# Patient Record
Sex: MALE | Race: WHITE | NOT HISPANIC OR LATINO
[De-identification: names, ages, dates, MRNs, and addresses within clinical notes are randomized per-mention and may not be internally consistent; named-entity substitution may affect disease eponyms.]

---

## 2017-01-11 ENCOUNTER — TRANSCRIPTION ENCOUNTER (OUTPATIENT)
Age: 57
End: 2017-01-11

## 2017-07-03 ENCOUNTER — OUTPATIENT (OUTPATIENT)
Dept: OUTPATIENT SERVICES | Facility: HOSPITAL | Age: 57
LOS: 1 days | End: 2017-07-03
Payer: MEDICARE

## 2017-07-03 ENCOUNTER — TRANSCRIPTION ENCOUNTER (OUTPATIENT)
Age: 57
End: 2017-07-03

## 2017-07-03 VITALS
RESPIRATION RATE: 20 BRPM | DIASTOLIC BLOOD PRESSURE: 53 MMHG | OXYGEN SATURATION: 95 % | HEART RATE: 82 BPM | HEIGHT: 71 IN | TEMPERATURE: 98 F | SYSTOLIC BLOOD PRESSURE: 96 MMHG | WEIGHT: 270.73 LBS

## 2017-07-03 VITALS
SYSTOLIC BLOOD PRESSURE: 99 MMHG | HEART RATE: 78 BPM | RESPIRATION RATE: 15 BRPM | OXYGEN SATURATION: 97 % | DIASTOLIC BLOOD PRESSURE: 54 MMHG

## 2017-07-03 DIAGNOSIS — H33.011 RETINAL DETACHMENT WITH SINGLE BREAK, RIGHT EYE: ICD-10-CM

## 2017-07-03 DIAGNOSIS — Z95.810 PRESENCE OF AUTOMATIC (IMPLANTABLE) CARDIAC DEFIBRILLATOR: Chronic | ICD-10-CM

## 2017-07-03 DIAGNOSIS — Z90.89 ACQUIRED ABSENCE OF OTHER ORGANS: Chronic | ICD-10-CM

## 2017-07-03 PROCEDURE — C1784: CPT

## 2017-07-03 PROCEDURE — 67108 REPAIR DETACHED RETINA: CPT | Mod: RT

## 2017-07-03 PROCEDURE — C1889: CPT

## 2017-07-03 NOTE — ASU PATIENT PROFILE, ADULT - PMH
Anxiety    CAD (Coronary Artery Disease)    Depression    Diabetes Mellitus Type II  x 7 yrs  Heart Attack  19 yrs ago  HTN (Hypertension)    Morbid Obesity    Nontoxic Nodular Goiter    Sleep Apnea  cpap - [facility]p.

## 2017-07-03 NOTE — ASU DISCHARGE PLAN (ADULT/PEDIATRIC). - PT EDUC
Gas bubble instructions reviewed with good understanding ,gas bubble bracelet on pt. Eye shield with instructions , sunglasses and eye kit given to patient.

## 2017-07-03 NOTE — ASU DISCHARGE PLAN (ADULT/PEDIATRIC). - NOTIFY
Swelling that continues/Bleeding that does not stop/Fever greater than 101/Pain not relieved by Medications/Persistent Nausea and Vomiting

## 2017-07-03 NOTE — ASU PATIENT PROFILE, ADULT - PSH
Abdominal Hernia  2009  AICD (automatic cardioverter/defibrillator) present    History of tonsillectomy    S/P CABG X 2  1992 - [facility]  S/P Laparoscopic Cholecystectomy  2008

## 2017-08-11 ENCOUNTER — TRANSCRIPTION ENCOUNTER (OUTPATIENT)
Age: 57
End: 2017-08-11

## 2017-08-14 ENCOUNTER — TRANSCRIPTION ENCOUNTER (OUTPATIENT)
Age: 57
End: 2017-08-14

## 2017-09-11 ENCOUNTER — TRANSCRIPTION ENCOUNTER (OUTPATIENT)
Age: 57
End: 2017-09-11

## 2018-02-16 ENCOUNTER — TRANSCRIPTION ENCOUNTER (OUTPATIENT)
Age: 58
End: 2018-02-16

## 2018-03-27 ENCOUNTER — TRANSCRIPTION ENCOUNTER (OUTPATIENT)
Age: 58
End: 2018-03-27

## 2018-04-24 ENCOUNTER — TRANSCRIPTION ENCOUNTER (OUTPATIENT)
Age: 58
End: 2018-04-24

## 2018-09-13 ENCOUNTER — TRANSCRIPTION ENCOUNTER (OUTPATIENT)
Age: 58
End: 2018-09-13

## 2019-03-19 ENCOUNTER — RESULT REVIEW (OUTPATIENT)
Age: 59
End: 2019-03-19

## 2019-03-19 ENCOUNTER — OUTPATIENT (OUTPATIENT)
Dept: OUTPATIENT SERVICES | Facility: HOSPITAL | Age: 59
LOS: 1 days | Discharge: HOME | End: 2019-03-19
Payer: MEDICARE

## 2019-03-19 VITALS
WEIGHT: 253.09 LBS | RESPIRATION RATE: 19 BRPM | OXYGEN SATURATION: 95 % | TEMPERATURE: 97 F | HEIGHT: 72 IN | HEART RATE: 69 BPM | SYSTOLIC BLOOD PRESSURE: 107 MMHG | DIASTOLIC BLOOD PRESSURE: 67 MMHG

## 2019-03-19 DIAGNOSIS — Z90.89 ACQUIRED ABSENCE OF OTHER ORGANS: Chronic | ICD-10-CM

## 2019-03-19 DIAGNOSIS — Z95.810 PRESENCE OF AUTOMATIC (IMPLANTABLE) CARDIAC DEFIBRILLATOR: Chronic | ICD-10-CM

## 2019-03-19 PROCEDURE — 88305 TISSUE EXAM BY PATHOLOGIST: CPT | Mod: 26

## 2019-03-19 RX ORDER — SODIUM CHLORIDE 9 MG/ML
1000 INJECTION INTRAMUSCULAR; INTRAVENOUS; SUBCUTANEOUS
Qty: 0 | Refills: 0 | Status: DISCONTINUED | OUTPATIENT
Start: 2019-03-19 | End: 2019-04-03

## 2019-03-19 RX ADMIN — SODIUM CHLORIDE 75 MILLILITER(S): 9 INJECTION INTRAMUSCULAR; INTRAVENOUS; SUBCUTANEOUS at 08:47

## 2019-03-20 LAB — SURGICAL PATHOLOGY FINAL REPORT - CH: SIGNIFICANT CHANGE UP

## 2019-03-22 DIAGNOSIS — Z12.11 ENCOUNTER FOR SCREENING FOR MALIGNANT NEOPLASM OF COLON: ICD-10-CM

## 2019-03-22 DIAGNOSIS — Z87.891 PERSONAL HISTORY OF NICOTINE DEPENDENCE: ICD-10-CM

## 2019-03-22 DIAGNOSIS — E89.0 POSTPROCEDURAL HYPOTHYROIDISM: ICD-10-CM

## 2019-03-22 DIAGNOSIS — Z95.1 PRESENCE OF AORTOCORONARY BYPASS GRAFT: ICD-10-CM

## 2019-03-22 DIAGNOSIS — I10 ESSENTIAL (PRIMARY) HYPERTENSION: ICD-10-CM

## 2019-03-22 DIAGNOSIS — Z86.010 PERSONAL HISTORY OF COLONIC POLYPS: ICD-10-CM

## 2019-03-22 DIAGNOSIS — K57.30 DIVERTICULOSIS OF LARGE INTESTINE WITHOUT PERFORATION OR ABSCESS WITHOUT BLEEDING: ICD-10-CM

## 2019-03-22 DIAGNOSIS — G47.33 OBSTRUCTIVE SLEEP APNEA (ADULT) (PEDIATRIC): ICD-10-CM

## 2019-03-22 DIAGNOSIS — E78.5 HYPERLIPIDEMIA, UNSPECIFIED: ICD-10-CM

## 2019-03-22 DIAGNOSIS — Z90.49 ACQUIRED ABSENCE OF OTHER SPECIFIED PARTS OF DIGESTIVE TRACT: ICD-10-CM

## 2019-03-22 DIAGNOSIS — F41.9 ANXIETY DISORDER, UNSPECIFIED: ICD-10-CM

## 2019-03-22 DIAGNOSIS — Z95.810 PRESENCE OF AUTOMATIC (IMPLANTABLE) CARDIAC DEFIBRILLATOR: ICD-10-CM

## 2019-03-22 DIAGNOSIS — Z99.89 DEPENDENCE ON OTHER ENABLING MACHINES AND DEVICES: ICD-10-CM

## 2019-03-22 DIAGNOSIS — Z79.52 LONG TERM (CURRENT) USE OF SYSTEMIC STEROIDS: ICD-10-CM

## 2019-03-22 DIAGNOSIS — Z79.84 LONG TERM (CURRENT) USE OF ORAL HYPOGLYCEMIC DRUGS: ICD-10-CM

## 2019-03-22 DIAGNOSIS — E11.9 TYPE 2 DIABETES MELLITUS WITHOUT COMPLICATIONS: ICD-10-CM

## 2019-03-22 DIAGNOSIS — D12.3 BENIGN NEOPLASM OF TRANSVERSE COLON: ICD-10-CM

## 2019-03-22 DIAGNOSIS — I25.10 ATHEROSCLEROTIC HEART DISEASE OF NATIVE CORONARY ARTERY WITHOUT ANGINA PECTORIS: ICD-10-CM

## 2019-04-26 ENCOUNTER — INPATIENT (INPATIENT)
Facility: HOSPITAL | Age: 59
LOS: 0 days | Discharge: HOME | End: 2019-04-27
Attending: FAMILY MEDICINE | Admitting: FAMILY MEDICINE
Payer: MEDICARE

## 2019-04-26 ENCOUNTER — TRANSCRIPTION ENCOUNTER (OUTPATIENT)
Age: 59
End: 2019-04-26

## 2019-04-26 VITALS
TEMPERATURE: 98 F | DIASTOLIC BLOOD PRESSURE: 47 MMHG | WEIGHT: 246.92 LBS | OXYGEN SATURATION: 96 % | HEIGHT: 72 IN | RESPIRATION RATE: 18 BRPM | SYSTOLIC BLOOD PRESSURE: 140 MMHG | HEART RATE: 80 BPM

## 2019-04-26 DIAGNOSIS — I25.2 OLD MYOCARDIAL INFARCTION: ICD-10-CM

## 2019-04-26 DIAGNOSIS — Z95.810 PRESENCE OF AUTOMATIC (IMPLANTABLE) CARDIAC DEFIBRILLATOR: Chronic | ICD-10-CM

## 2019-04-26 DIAGNOSIS — Z95.1 PRESENCE OF AORTOCORONARY BYPASS GRAFT: ICD-10-CM

## 2019-04-26 DIAGNOSIS — I25.10 ATHEROSCLEROTIC HEART DISEASE OF NATIVE CORONARY ARTERY WITHOUT ANGINA PECTORIS: ICD-10-CM

## 2019-04-26 DIAGNOSIS — I10 ESSENTIAL (PRIMARY) HYPERTENSION: ICD-10-CM

## 2019-04-26 DIAGNOSIS — Z95.810 PRESENCE OF AUTOMATIC (IMPLANTABLE) CARDIAC DEFIBRILLATOR: ICD-10-CM

## 2019-04-26 DIAGNOSIS — R55 SYNCOPE AND COLLAPSE: ICD-10-CM

## 2019-04-26 DIAGNOSIS — F32.9 MAJOR DEPRESSIVE DISORDER, SINGLE EPISODE, UNSPECIFIED: ICD-10-CM

## 2019-04-26 DIAGNOSIS — E11.9 TYPE 2 DIABETES MELLITUS WITHOUT COMPLICATIONS: ICD-10-CM

## 2019-04-26 DIAGNOSIS — I95.1 ORTHOSTATIC HYPOTENSION: ICD-10-CM

## 2019-04-26 DIAGNOSIS — G47.33 OBSTRUCTIVE SLEEP APNEA (ADULT) (PEDIATRIC): ICD-10-CM

## 2019-04-26 DIAGNOSIS — F41.9 ANXIETY DISORDER, UNSPECIFIED: ICD-10-CM

## 2019-04-26 DIAGNOSIS — E66.01 MORBID (SEVERE) OBESITY DUE TO EXCESS CALORIES: ICD-10-CM

## 2019-04-26 DIAGNOSIS — D69.6 THROMBOCYTOPENIA, UNSPECIFIED: ICD-10-CM

## 2019-04-26 DIAGNOSIS — E03.9 HYPOTHYROIDISM, UNSPECIFIED: ICD-10-CM

## 2019-04-26 DIAGNOSIS — E83.42 HYPOMAGNESEMIA: ICD-10-CM

## 2019-04-26 DIAGNOSIS — H81.399 OTHER PERIPHERAL VERTIGO, UNSPECIFIED EAR: ICD-10-CM

## 2019-04-26 DIAGNOSIS — Z90.89 ACQUIRED ABSENCE OF OTHER ORGANS: Chronic | ICD-10-CM

## 2019-04-26 DIAGNOSIS — I50.22 CHRONIC SYSTOLIC (CONGESTIVE) HEART FAILURE: ICD-10-CM

## 2019-04-26 LAB
ALBUMIN SERPL ELPH-MCNC: 3.4 G/DL — SIGNIFICANT CHANGE UP (ref 3.3–5)
ALP SERPL-CCNC: 57 U/L — SIGNIFICANT CHANGE UP (ref 40–120)
ALT FLD-CCNC: 41 U/L — SIGNIFICANT CHANGE UP (ref 12–78)
ANION GAP SERPL CALC-SCNC: 10 MMOL/L — SIGNIFICANT CHANGE UP (ref 5–17)
APPEARANCE UR: CLEAR — SIGNIFICANT CHANGE UP
AST SERPL-CCNC: 22 U/L — SIGNIFICANT CHANGE UP (ref 15–37)
BACTERIA # UR AUTO: ABNORMAL
BASOPHILS # BLD AUTO: 0.02 K/UL — SIGNIFICANT CHANGE UP (ref 0–0.2)
BASOPHILS NFR BLD AUTO: 0.3 % — SIGNIFICANT CHANGE UP (ref 0–2)
BILIRUB SERPL-MCNC: 0.5 MG/DL — SIGNIFICANT CHANGE UP (ref 0.2–1.2)
BILIRUB UR-MCNC: NEGATIVE — SIGNIFICANT CHANGE UP
BUN SERPL-MCNC: 24 MG/DL — HIGH (ref 7–23)
CALCIUM SERPL-MCNC: 8.6 MG/DL — SIGNIFICANT CHANGE UP (ref 8.5–10.1)
CHLORIDE SERPL-SCNC: 108 MMOL/L — SIGNIFICANT CHANGE UP (ref 96–108)
CO2 SERPL-SCNC: 22 MMOL/L — SIGNIFICANT CHANGE UP (ref 22–31)
COLOR SPEC: YELLOW — SIGNIFICANT CHANGE UP
CREAT SERPL-MCNC: 0.96 MG/DL — SIGNIFICANT CHANGE UP (ref 0.5–1.3)
DIFF PNL FLD: ABNORMAL
EOSINOPHIL # BLD AUTO: 0.13 K/UL — SIGNIFICANT CHANGE UP (ref 0–0.5)
EOSINOPHIL NFR BLD AUTO: 2.3 % — SIGNIFICANT CHANGE UP (ref 0–6)
EPI CELLS # UR: SIGNIFICANT CHANGE UP
GLUCOSE BLDC GLUCOMTR-MCNC: 202 MG/DL — HIGH (ref 70–99)
GLUCOSE SERPL-MCNC: 145 MG/DL — HIGH (ref 70–99)
GLUCOSE UR QL: NEGATIVE MG/DL — SIGNIFICANT CHANGE UP
HCT VFR BLD CALC: 40.2 % — SIGNIFICANT CHANGE UP (ref 39–50)
HGB BLD-MCNC: 12.5 G/DL — LOW (ref 13–17)
IMM GRANULOCYTES NFR BLD AUTO: 0.5 % — SIGNIFICANT CHANGE UP (ref 0–1.5)
KETONES UR-MCNC: NEGATIVE — SIGNIFICANT CHANGE UP
LEUKOCYTE ESTERASE UR-ACNC: NEGATIVE — SIGNIFICANT CHANGE UP
LYMPHOCYTES # BLD AUTO: 1.2 K/UL — SIGNIFICANT CHANGE UP (ref 1–3.3)
LYMPHOCYTES # BLD AUTO: 20.9 % — SIGNIFICANT CHANGE UP (ref 13–44)
MAGNESIUM SERPL-MCNC: 1.4 MG/DL — LOW (ref 1.6–2.6)
MCHC RBC-ENTMCNC: 25.1 PG — LOW (ref 27–34)
MCHC RBC-ENTMCNC: 31.1 GM/DL — LOW (ref 32–36)
MCV RBC AUTO: 80.6 FL — SIGNIFICANT CHANGE UP (ref 80–100)
MONOCYTES # BLD AUTO: 0.41 K/UL — SIGNIFICANT CHANGE UP (ref 0–0.9)
MONOCYTES NFR BLD AUTO: 7.1 % — SIGNIFICANT CHANGE UP (ref 2–14)
NEUTROPHILS # BLD AUTO: 3.95 K/UL — SIGNIFICANT CHANGE UP (ref 1.8–7.4)
NEUTROPHILS NFR BLD AUTO: 68.9 % — SIGNIFICANT CHANGE UP (ref 43–77)
NITRITE UR-MCNC: NEGATIVE — SIGNIFICANT CHANGE UP
NRBC # BLD: 0 /100 WBCS — SIGNIFICANT CHANGE UP (ref 0–0)
PH UR: 5 — SIGNIFICANT CHANGE UP (ref 5–8)
PLATELET # BLD AUTO: 121 K/UL — LOW (ref 150–400)
POTASSIUM SERPL-MCNC: 4.5 MMOL/L — SIGNIFICANT CHANGE UP (ref 3.5–5.3)
POTASSIUM SERPL-SCNC: 4.5 MMOL/L — SIGNIFICANT CHANGE UP (ref 3.5–5.3)
PROT SERPL-MCNC: 7.4 GM/DL — SIGNIFICANT CHANGE UP (ref 6–8.3)
PROT UR-MCNC: 15 MG/DL
RBC # BLD: 4.99 M/UL — SIGNIFICANT CHANGE UP (ref 4.2–5.8)
RBC # FLD: 16.5 % — HIGH (ref 10.3–14.5)
RBC CASTS # UR COMP ASSIST: >50 /HPF (ref 0–4)
SODIUM SERPL-SCNC: 140 MMOL/L — SIGNIFICANT CHANGE UP (ref 135–145)
SP GR SPEC: 1.02 — SIGNIFICANT CHANGE UP (ref 1.01–1.02)
TROPONIN I SERPL-MCNC: <0.015 NG/ML — SIGNIFICANT CHANGE UP (ref 0.01–0.04)
TROPONIN I SERPL-MCNC: <0.015 NG/ML — SIGNIFICANT CHANGE UP (ref 0.01–0.04)
TSH SERPL-MCNC: 0.14 UU/ML — LOW (ref 0.34–4.82)
UROBILINOGEN FLD QL: NEGATIVE MG/DL — SIGNIFICANT CHANGE UP
WBC # BLD: 5.74 K/UL — SIGNIFICANT CHANGE UP (ref 3.8–10.5)
WBC # FLD AUTO: 5.74 K/UL — SIGNIFICANT CHANGE UP (ref 3.8–10.5)
WBC UR QL: NEGATIVE — SIGNIFICANT CHANGE UP

## 2019-04-26 PROCEDURE — 70450 CT HEAD/BRAIN W/O DYE: CPT | Mod: 26

## 2019-04-26 PROCEDURE — 71045 X-RAY EXAM CHEST 1 VIEW: CPT | Mod: 26

## 2019-04-26 PROCEDURE — 93010 ELECTROCARDIOGRAM REPORT: CPT

## 2019-04-26 PROCEDURE — 99285 EMERGENCY DEPT VISIT HI MDM: CPT

## 2019-04-26 RX ORDER — ALLOPURINOL 300 MG
1 TABLET ORAL
Qty: 0 | Refills: 0 | COMMUNITY

## 2019-04-26 RX ORDER — MAGNESIUM OXIDE 400 MG ORAL TABLET 241.3 MG
400 TABLET ORAL EVERY 4 HOURS
Qty: 0 | Refills: 0 | Status: COMPLETED | OUTPATIENT
Start: 2019-04-26 | End: 2019-04-27

## 2019-04-26 RX ORDER — CHOLECALCIFEROL (VITAMIN D3) 125 MCG
1 CAPSULE ORAL
Qty: 0 | Refills: 0 | COMMUNITY

## 2019-04-26 RX ORDER — SODIUM CHLORIDE 9 MG/ML
500 INJECTION, SOLUTION INTRAVENOUS
Qty: 0 | Refills: 0 | Status: COMPLETED | OUTPATIENT
Start: 2019-04-26 | End: 2019-04-27

## 2019-04-26 RX ORDER — METOPROLOL TARTRATE 50 MG
1 TABLET ORAL
Qty: 0 | Refills: 0 | COMMUNITY

## 2019-04-26 RX ORDER — ALPRAZOLAM 0.25 MG
1 TABLET ORAL
Qty: 0 | Refills: 0 | COMMUNITY

## 2019-04-26 RX ORDER — ASPIRIN/CALCIUM CARB/MAGNESIUM 324 MG
1 TABLET ORAL
Qty: 0 | Refills: 0 | COMMUNITY

## 2019-04-26 RX ORDER — ALPRAZOLAM 0.25 MG
0.5 TABLET ORAL THREE TIMES A DAY
Qty: 0 | Refills: 0 | Status: DISCONTINUED | OUTPATIENT
Start: 2019-04-26 | End: 2019-04-27

## 2019-04-26 RX ORDER — DEXTROSE 50 % IN WATER 50 %
15 SYRINGE (ML) INTRAVENOUS ONCE
Qty: 0 | Refills: 0 | Status: DISCONTINUED | OUTPATIENT
Start: 2019-04-26 | End: 2019-04-27

## 2019-04-26 RX ORDER — SIMVASTATIN 20 MG/1
20 TABLET, FILM COATED ORAL AT BEDTIME
Qty: 0 | Refills: 0 | Status: DISCONTINUED | OUTPATIENT
Start: 2019-04-26 | End: 2019-04-27

## 2019-04-26 RX ORDER — ESCITALOPRAM OXALATE 10 MG/1
1 TABLET, FILM COATED ORAL
Qty: 0 | Refills: 0 | COMMUNITY

## 2019-04-26 RX ORDER — SIMVASTATIN 20 MG/1
1 TABLET, FILM COATED ORAL
Qty: 0 | Refills: 0 | COMMUNITY

## 2019-04-26 RX ORDER — LEVOTHYROXINE SODIUM 125 MCG
1 TABLET ORAL
Qty: 0 | Refills: 0 | COMMUNITY

## 2019-04-26 RX ORDER — ESCITALOPRAM OXALATE 10 MG/1
20 TABLET, FILM COATED ORAL DAILY
Qty: 0 | Refills: 0 | Status: DISCONTINUED | OUTPATIENT
Start: 2019-04-26 | End: 2019-04-27

## 2019-04-26 RX ORDER — METFORMIN HYDROCHLORIDE 850 MG/1
1 TABLET ORAL
Qty: 0 | Refills: 0 | COMMUNITY

## 2019-04-26 RX ORDER — OMEGA-3 ACID ETHYL ESTERS 1 G
1 CAPSULE ORAL
Qty: 0 | Refills: 0 | COMMUNITY

## 2019-04-26 RX ORDER — GABAPENTIN 400 MG/1
1 CAPSULE ORAL
Qty: 0 | Refills: 0 | COMMUNITY

## 2019-04-26 RX ORDER — DULAGLUTIDE 4.5 MG/.5ML
0 INJECTION, SOLUTION SUBCUTANEOUS
Qty: 0 | Refills: 0 | COMMUNITY

## 2019-04-26 RX ORDER — EPLERENONE 50 MG/1
1 TABLET, FILM COATED ORAL
Qty: 0 | Refills: 0 | COMMUNITY

## 2019-04-26 RX ORDER — DEXTROSE 50 % IN WATER 50 %
25 SYRINGE (ML) INTRAVENOUS ONCE
Qty: 0 | Refills: 0 | Status: DISCONTINUED | OUTPATIENT
Start: 2019-04-26 | End: 2019-04-27

## 2019-04-26 RX ORDER — ALLOPURINOL 300 MG
300 TABLET ORAL DAILY
Qty: 0 | Refills: 0 | Status: DISCONTINUED | OUTPATIENT
Start: 2019-04-26 | End: 2019-04-27

## 2019-04-26 RX ORDER — LEVOTHYROXINE SODIUM 125 MCG
25 TABLET ORAL DAILY
Qty: 0 | Refills: 0 | Status: DISCONTINUED | OUTPATIENT
Start: 2019-04-26 | End: 2019-04-27

## 2019-04-26 RX ORDER — ASPIRIN/CALCIUM CARB/MAGNESIUM 324 MG
325 TABLET ORAL DAILY
Qty: 0 | Refills: 0 | Status: DISCONTINUED | OUTPATIENT
Start: 2019-04-26 | End: 2019-04-27

## 2019-04-26 RX ORDER — GABAPENTIN 400 MG/1
600 CAPSULE ORAL AT BEDTIME
Qty: 0 | Refills: 0 | Status: DISCONTINUED | OUTPATIENT
Start: 2019-04-26 | End: 2019-04-27

## 2019-04-26 RX ORDER — SPIRONOLACTONE 25 MG/1
1 TABLET, FILM COATED ORAL
Qty: 0 | Refills: 0 | COMMUNITY

## 2019-04-26 RX ORDER — LISINOPRIL 2.5 MG/1
20 TABLET ORAL DAILY
Qty: 0 | Refills: 0 | Status: DISCONTINUED | OUTPATIENT
Start: 2019-04-26 | End: 2019-04-27

## 2019-04-26 RX ORDER — GLUCAGON INJECTION, SOLUTION 0.5 MG/.1ML
1 INJECTION, SOLUTION SUBCUTANEOUS ONCE
Qty: 0 | Refills: 0 | Status: DISCONTINUED | OUTPATIENT
Start: 2019-04-26 | End: 2019-04-27

## 2019-04-26 RX ORDER — INSULIN GLARGINE 100 [IU]/ML
12 INJECTION, SOLUTION SUBCUTANEOUS AT BEDTIME
Qty: 0 | Refills: 0 | Status: DISCONTINUED | OUTPATIENT
Start: 2019-04-26 | End: 2019-04-27

## 2019-04-26 RX ORDER — SODIUM CHLORIDE 9 MG/ML
1000 INJECTION, SOLUTION INTRAVENOUS
Qty: 0 | Refills: 0 | Status: DISCONTINUED | OUTPATIENT
Start: 2019-04-26 | End: 2019-04-27

## 2019-04-26 RX ORDER — DEXTROSE 50 % IN WATER 50 %
12.5 SYRINGE (ML) INTRAVENOUS ONCE
Qty: 0 | Refills: 0 | Status: DISCONTINUED | OUTPATIENT
Start: 2019-04-26 | End: 2019-04-27

## 2019-04-26 RX ORDER — LISINOPRIL 2.5 MG/1
1 TABLET ORAL
Qty: 0 | Refills: 0 | COMMUNITY

## 2019-04-26 RX ORDER — INSULIN LISPRO 100/ML
VIAL (ML) SUBCUTANEOUS
Qty: 0 | Refills: 0 | Status: DISCONTINUED | OUTPATIENT
Start: 2019-04-26 | End: 2019-04-27

## 2019-04-26 RX ORDER — LEVOTHYROXINE SODIUM 125 MCG
200 TABLET ORAL DAILY
Qty: 0 | Refills: 0 | Status: DISCONTINUED | OUTPATIENT
Start: 2019-04-26 | End: 2019-04-27

## 2019-04-26 RX ADMIN — GABAPENTIN 600 MILLIGRAM(S): 400 CAPSULE ORAL at 21:30

## 2019-04-26 RX ADMIN — SIMVASTATIN 20 MILLIGRAM(S): 20 TABLET, FILM COATED ORAL at 21:31

## 2019-04-26 RX ADMIN — MAGNESIUM OXIDE 400 MG ORAL TABLET 400 MILLIGRAM(S): 241.3 TABLET ORAL at 21:30

## 2019-04-26 RX ADMIN — INSULIN GLARGINE 12 UNIT(S): 100 INJECTION, SOLUTION SUBCUTANEOUS at 23:20

## 2019-04-26 NOTE — H&P ADULT - ASSESSMENT
Dizziness w/ near syncope likely volume depletion vs vertigo vs cardiac arrhythmia  admit to tele  orthostatic vitals including now  IVF if +orthostasis  EP consult for device interrogation  consult cardiology  * he reports he had an extensive cardiac work-up 1 month ago: thallium stress test was normal, carotid doppler with 45% stenosis to Left side, TTE with unchanged LVEF 30-35% as was 1 yr prior    CAD/HFrEF - chronic  stable / chest pain free / appears compensated  con't statin / asa/ spironolactone  not on BB likely d/t AVB    T2DM / obesity  con't home meds except metformin: dose per formulary  nutrition consult    hypomagnesia  replete with mag oxide 400mg x 2 doses Q 4hr  f/u BMP in am    Thrombocypenia - chronicity unknown  monitor     Anxiety  stable  con't home meds    Chronic pain / neuropathy  con't home dose neurontin    Hypothyroid d/t thyroidectomy  TSH 0.014  con't home meds, ck T3/T4 in am  oupt f/u    RIK  family will bring in home cpap machine    DVT PPX  low risk VTE score = 0  ambulate as tolerated    Dispo  full code  pt agreed with discussed plan of care Dizziness w/ near syncope likely volume depletion vs vertigo vs cardiac arrhythmia  admit to tele  orthostatic vitals including now  IVF if +orthostasis  EP consult for device interrogation  consult cardiology  * he reports he had an extensive cardiac work-up 1 month ago: thallium stress test was normal, carotid doppler with 45% stenosis to Left side, TTE with unchanged LVEF 30-35% as was 1 yr prior    CAD/HFrEF - chronic  stable / chest pain free / appears compensated  con't statin / asa/ spironolactone/lisinopril   not on BB likely d/t AVB    T2DM / obesity  con't home meds except metformin: dose per formulary  nutrition consult    hypomagnesia  replete with mag oxide 400mg x 2 doses Q 4hr  f/u BMP in am    Thrombocypenia - chronicity unknown  monitor     Anxiety  stable  con't home meds    Chronic pain / neuropathy  con't home dose neurontin    Hypothyroid d/t thyroidectomy  TSH 0.14  con't home meds, ck T3/T4 in am  as per Pt dose od levothyroxine was changed about 5 weeks ago ( increased drom 200mcg). Pt is due for f/u     RIK  family will bring in home cpap machine    DVT PPX  low risk VTE score = 0  ambulate as tolerated    Dispo  full code  pt agreed with discussed plan of care

## 2019-04-26 NOTE — ED ADULT NURSE NOTE - NSIMPLEMENTINTERV_GEN_ALL_ED
Implemented All Universal Safety Interventions:  [place] to call system. Call bell, personal items and telephone within reach. Instruct patient to call for assistance. Room bathroom lighting operational. Non-slip footwear when patient is off stretcher. Physically safe environment: no spills, clutter or unnecessary equipment. Stretcher in lowest position, wheels locked, appropriate side rails in place.

## 2019-04-26 NOTE — ED PROVIDER NOTE - CLINICAL SUMMARY MEDICAL DECISION MAKING FREE TEXT BOX
58 M with dizziness near syncope, EKG, CXR, labs, CT, admit. 58 M with dizziness near syncope, EKG, CXR, labs, CT, admit.    [employer] report shows episode of VF,VT but unsure if it was from this episode that he had. EKG slightly changed from prior. Will admit for acs/near syncope.   Spoke with hospitalist who will admit to her service.

## 2019-04-26 NOTE — H&P ADULT - HISTORY OF PRESENT ILLNESS
58 year old Man with hx obesity, Nontoxic Nodular Goiter s/p total thyroidectomy with frozen section 04/2010, RIK uses cpap, CAD s/p angioplasty 1980's f/b MI 1992 while undergoing stress test s/p 2V CABG at [facility], PPM placement ~7 yrs ago for "electrical problem";  anxiety, depression, DM, HTN, vertigo 20 yrs ago, neuropathy, bulging disc c/b chronic Lower extremity and back pain (had epidural 2 months ago), retina detachment, HFrEF 30-35%, IBS  presented to ED c/o sudden onset of dizziness while standing and several minutes after "working in the garage moving things"; he also had dizziness with change in position (turning head from side to side and from lying to sitting/standing position). Also had 1 episode of sudden/incontinent LBM yesterday. Denies CP/palpitation/SOB/HA/abd pain/n/v/f/c    In the ED plt 121 otherwise CBC normal, mg 1.4, TSH 0.14, TNI negative x1, ECG 1st degree avb no significant ST-T /Rhythm findings, Ua moderate blood, few bacteria, protein 15 otherwise normal, CT head no ICH/mass/shift. Handstronic report from device interrogation by ED team with 1 episode of AF/VT between timeframe of Feb - April (no definite day and time identified).

## 2019-04-26 NOTE — ED ADULT NURSE NOTE - CHPI ED NUR SYMPTOMS NEG
no chills/no vomiting/no back pain/no diaphoresis/no shortness of breath/no nausea/no chest pain/no fever/no congestion

## 2019-04-26 NOTE — H&P ADULT - NSHPREVIEWOFSYSTEMS_GEN_ALL_CORE
REVIEW OF SYSTEMS    General: denies fever, chills    Skin/Breast: no changes  	  Ophthalmologic: no recent vision changes   	  ENMT:  normal    Respiratory and Thorax: no cough  	  Cardiovascular: denies CP/palpitations    Gastrointestinal: as in HPI    Genitourinary: no suprapubic tenderness    Musculoskeletal: no muscle tenderness, no joint swelling or tenderness    Neurological: as in HPI    Psychiatric: denies S/H ideation, no depression/anxiety	    Hematology/Lymphatics: normal, no LN palpable	    Endocrine: normal    Allergic/Immunologic:	      REVIEW OF SYSTEM: ROS comprehensively negative except as above

## 2019-04-26 NOTE — H&P ADULT - NSICDXPASTSURGICALHX_GEN_ALL_CORE_FT
PAST SURGICAL HISTORY:  Abdominal Hernia 2009    AICD (automatic cardioverter/defibrillator) present     History of tonsillectomy     S/P CABG X 2 1992 - [place]    S/P Laparoscopic Cholecystectomy 2008

## 2019-04-26 NOTE — ED PROVIDER NOTE - PMH
Anxiety    CAD (Coronary Artery Disease)    Depression    Diabetes Mellitus Type II  x 7 yrs  Heart Attack  19 yrs ago  HTN (Hypertension)    Morbid Obesity    Nontoxic Nodular Goiter    Sleep Apnea  cpap - [facility] grp.

## 2019-04-26 NOTE — ED PROVIDER NOTE - PROGRESS NOTE DETAILS
59 yo male with a PMH of ibs, vertigo, chf, dm, htn, hld, PPM, CAD, open heart surgery s/p cardiac arrest presents with 4 days. Pt states that these last episodes were different from his vertigo episodes and today it was worse. Pt was working in his garage and started to feel lightheaded and felt his head geetin warm. Went back inside and went to his knee and braced himself on the table. Denies f/c/sweating, cp, sob, abd pain, n/v/d, LOC. Labs, CT head, cxr, ekg. Likely admission. -Gregg Mcintosh PA-C

## 2019-04-26 NOTE — ED PROVIDER NOTE - ATTENDING CONTRIBUTION TO CARE
I, Lillie Looney DO,  performed the initial face to face bedside interview with this patient regarding history of present illness, review of symptoms and relevant past medical, social and family history.  I completed an independent physical examination.  I was the initial provider who evaluated this patient. I have signed out the follow up of any pending tests (i.e. labs, radiological studies) to the ACP.  I have communicated the patient’s plan of care and disposition with the ACP.  The history, relevant review of systems, past medical and surgical history, medical decision making, and physical examination was documented by the scribe in my presence and I attest to the accuracy of the documentation.

## 2019-04-26 NOTE — ED PROVIDER NOTE - OBJECTIVE STATEMENT
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 Pt states that today he was coming in from the garage when he felt lightheaded, felt his face get hot, and thought he was going to faint. Pt decided to go to urgent care who sent him to [facility]. Pt states that when he gets out of bed he feels lightheaded but it only lasts a few seconds. Pt also notes that with any movement in his head, he feels lightheaded. Pt has had vertigo in the past and notes that this episode feels "different." Denies CP, BLE edema, N/V, or SOB. PMD: Dr. Bustamante. Allergy: Iodine, IV contrast.  Cardiologist: at [place].

## 2019-04-26 NOTE — H&P ADULT - NSHPSOCIALHISTORY_GEN_ALL_CORE
Smoke non-nicotine cigars  Quit cigarette smoking 30 yrs ago  drinks 1-2 glasses of wine occassionally with dinner

## 2019-04-26 NOTE — H&P ADULT - NSHPPHYSICALEXAM_GEN_ALL_CORE
PHYSICAL EXAM:    GENERAL: obese, NAD    HEENT: AT/NC,  inner aspect of right eye with redness, pupils equal and reactive, EOMI, no oropharyngeal lesions, erythema, exudates, oral thrush    NECK:   supple, no carotid bruits, no palpable lymph nodes, no thyromegaly    CV:  +S1, +S2, regular,  no m/c/r    RESP:   lungs clear to auscultation bilaterally, no wheezing, rales, rhonchi, good air entry bilaterally    BREAST:  not examined    GI: obese, abdomen soft, non-tender, non-distended, normal BS, no bruits, no abdominal masses, no palpable masses    RECTAL:  not examined    :  no suprapubic tenderness    MSK:   normal muscle tone, no atrophy, no rigidity, no contractions    EXT: chronic vascular changes to BLE,  no clubbing, no cyanosis, no edema, no calf pain, swelling or erythema    VASCULAR:  pulses equal and symmetric in the upper and lower extremities    NEURO:  AAOX3,  no pronation drift, lifts and resist BUE/BLE strongly 5/5, tongue midline, no focal neurological deficits, follows all commands, able to move extremities spontaneously    SKIN:  no ulcers, lesions or rashes    Vital Signs Last 24 Hrs  T(C): 36.7 (26 Apr 2019 16:12), Max: 36.7 (26 Apr 2019 16:12)  T(F): 98 (26 Apr 2019 16:12), Max: 98 (26 Apr 2019 16:12)  HR: 80 (26 Apr 2019 16:12) (80 - 80)  BP: 140/47 (26 Apr 2019 16:12) (140/47 - 140/47)  BP(mean): --  RR: 18 (26 Apr 2019 16:12) (18 - 18)  SpO2: 96% (26 Apr 2019 16:12) (96% - 96%)

## 2019-04-26 NOTE — PATIENT PROFILE ADULT - STATED REASON FOR ADMISSION
I was putting thing away in garage, I got lightheaded, I fell and then I had taken a couple minutes to get up

## 2019-04-26 NOTE — ED PROVIDER NOTE - NEUROLOGICAL, MLM
Alert and oriented, no focal deficits, no motor or sensory deficits. finger-nose and heel-shin is normal.

## 2019-04-26 NOTE — ED ADULT NURSE NOTE - CADM POA URETHRAL CATHETER
----- Message from Esther Ramos sent at 3/11/2019  8:04 AM CDT -----  Type: Needs Medical Advice    Who Called:  [facility] home health nurse- Vir[place] (please be specific): NA  How long has patient had these symptoms:  NA  Pharmacy name and phone #:  NA   Best Call Back Number: [phone]  Additional Information: Patient was not home for schedule home visit, patient's contact number is not working. The nurse will visit the patient's home tomorrow to collect labs.     No

## 2019-04-26 NOTE — ED ADULT TRIAGE NOTE - CHIEF COMPLAINT QUOTE
Pt sent from urgent care.  Multiple near syncopal episodes over last few days.  Pt denies SOB or Chest pain.

## 2019-04-26 NOTE — ED ADULT NURSE REASSESSMENT NOTE - NS ED NURSE REASSESS COMMENT FT1
received report from malik shannon, pt resting comfortably, vss, aware of plan of care, awaiting bed

## 2019-04-26 NOTE — H&P ADULT - NSICDXPASTMEDICALHX_GEN_ALL_CORE_FT
PAST MEDICAL HISTORY:  Anxiety     CAD (Coronary Artery Disease)     Depression     Diabetes Mellitus Type II x 7 yrs    Heart Attack 19 yrs ago    HTN (Hypertension)     Morbid Obesity     Nontoxic Nodular Goiter     Sleep Apnea cpap - [facility].

## 2019-04-26 NOTE — ED ADULT NURSE NOTE - OBJECTIVE STATEMENT
Patient complaining of dizziness and lightheadedness. Patient with extensive cardiac history and pacemaker.

## 2019-04-27 ENCOUNTER — TRANSCRIPTION ENCOUNTER (OUTPATIENT)
Age: 59
End: 2019-04-27

## 2019-04-27 VITALS
DIASTOLIC BLOOD PRESSURE: 68 MMHG | RESPIRATION RATE: 16 BRPM | TEMPERATURE: 98 F | HEART RATE: 75 BPM | SYSTOLIC BLOOD PRESSURE: 108 MMHG | OXYGEN SATURATION: 99 %

## 2019-04-27 DIAGNOSIS — G47.30 SLEEP APNEA, UNSPECIFIED: ICD-10-CM

## 2019-04-27 DIAGNOSIS — R55 SYNCOPE AND COLLAPSE: ICD-10-CM

## 2019-04-27 DIAGNOSIS — I25.10 ATHEROSCLEROTIC HEART DISEASE OF NATIVE CORONARY ARTERY WITHOUT ANGINA PECTORIS: ICD-10-CM

## 2019-04-27 DIAGNOSIS — I10 ESSENTIAL (PRIMARY) HYPERTENSION: ICD-10-CM

## 2019-04-27 LAB
ANION GAP SERPL CALC-SCNC: 7 MMOL/L — SIGNIFICANT CHANGE UP (ref 5–17)
APPEARANCE UR: CLEAR — SIGNIFICANT CHANGE UP
BACTERIA # UR AUTO: NEGATIVE — SIGNIFICANT CHANGE UP
BILIRUB UR-MCNC: NEGATIVE — SIGNIFICANT CHANGE UP
BUN SERPL-MCNC: 19 MG/DL — SIGNIFICANT CHANGE UP (ref 7–23)
CALCIUM SERPL-MCNC: 8.5 MG/DL — SIGNIFICANT CHANGE UP (ref 8.5–10.1)
CHLORIDE SERPL-SCNC: 108 MMOL/L — SIGNIFICANT CHANGE UP (ref 96–108)
CO2 SERPL-SCNC: 25 MMOL/L — SIGNIFICANT CHANGE UP (ref 22–31)
COLOR SPEC: YELLOW — SIGNIFICANT CHANGE UP
COMMENT - URINE: SIGNIFICANT CHANGE UP
CREAT SERPL-MCNC: 0.8 MG/DL — SIGNIFICANT CHANGE UP (ref 0.5–1.3)
DIFF PNL FLD: ABNORMAL
EPI CELLS # UR: SIGNIFICANT CHANGE UP
GLUCOSE BLDC GLUCOMTR-MCNC: 127 MG/DL — HIGH (ref 70–99)
GLUCOSE BLDC GLUCOMTR-MCNC: 133 MG/DL — HIGH (ref 70–99)
GLUCOSE BLDC GLUCOMTR-MCNC: 157 MG/DL — HIGH (ref 70–99)
GLUCOSE SERPL-MCNC: 133 MG/DL — HIGH (ref 70–99)
GLUCOSE UR QL: NEGATIVE MG/DL — SIGNIFICANT CHANGE UP
HCT VFR BLD CALC: 38.1 % — LOW (ref 39–50)
HCV AB S/CO SERPL IA: 0.1 S/CO — SIGNIFICANT CHANGE UP (ref 0–0.99)
HCV AB SERPL-IMP: SIGNIFICANT CHANGE UP
HGB BLD-MCNC: 11.7 G/DL — LOW (ref 13–17)
KETONES UR-MCNC: NEGATIVE — SIGNIFICANT CHANGE UP
LEUKOCYTE ESTERASE UR-ACNC: NEGATIVE — SIGNIFICANT CHANGE UP
MAGNESIUM SERPL-MCNC: 1.6 MG/DL — SIGNIFICANT CHANGE UP (ref 1.6–2.6)
MCHC RBC-ENTMCNC: 25 PG — LOW (ref 27–34)
MCHC RBC-ENTMCNC: 30.7 GM/DL — LOW (ref 32–36)
MCV RBC AUTO: 81.4 FL — SIGNIFICANT CHANGE UP (ref 80–100)
NITRITE UR-MCNC: NEGATIVE — SIGNIFICANT CHANGE UP
NRBC # BLD: 0 /100 WBCS — SIGNIFICANT CHANGE UP (ref 0–0)
PH UR: 5 — SIGNIFICANT CHANGE UP (ref 5–8)
PLATELET # BLD AUTO: 92 K/UL — LOW (ref 150–400)
POTASSIUM SERPL-MCNC: 4.6 MMOL/L — SIGNIFICANT CHANGE UP (ref 3.5–5.3)
POTASSIUM SERPL-SCNC: 4.6 MMOL/L — SIGNIFICANT CHANGE UP (ref 3.5–5.3)
PROT UR-MCNC: NEGATIVE MG/DL — SIGNIFICANT CHANGE UP
RBC # BLD: 4.68 M/UL — SIGNIFICANT CHANGE UP (ref 4.2–5.8)
RBC # FLD: 16.4 % — HIGH (ref 10.3–14.5)
RBC CASTS # UR COMP ASSIST: ABNORMAL /HPF (ref 0–4)
SODIUM SERPL-SCNC: 140 MMOL/L — SIGNIFICANT CHANGE UP (ref 135–145)
SP GR SPEC: 1.02 — SIGNIFICANT CHANGE UP (ref 1.01–1.02)
T4 AB SER-ACNC: 8.3 UG/DL — SIGNIFICANT CHANGE UP (ref 4.6–12)
UROBILINOGEN FLD QL: NEGATIVE MG/DL — SIGNIFICANT CHANGE UP
WBC # BLD: 4.44 K/UL — SIGNIFICANT CHANGE UP (ref 3.8–10.5)
WBC # FLD AUTO: 4.44 K/UL — SIGNIFICANT CHANGE UP (ref 3.8–10.5)
WBC UR QL: SIGNIFICANT CHANGE UP

## 2019-04-27 PROCEDURE — 93010 ELECTROCARDIOGRAM REPORT: CPT

## 2019-04-27 PROCEDURE — 93282 PRGRMG EVAL IMPLANTABLE DFB: CPT | Mod: 26

## 2019-04-27 PROCEDURE — 99223 1ST HOSP IP/OBS HIGH 75: CPT

## 2019-04-27 RX ORDER — DULAGLUTIDE 4.5 MG/.5ML
1 INJECTION, SOLUTION SUBCUTANEOUS
Qty: 0 | Refills: 0 | COMMUNITY

## 2019-04-27 RX ORDER — MAGNESIUM SULFATE 500 MG/ML
1 VIAL (ML) INJECTION ONCE
Qty: 0 | Refills: 0 | Status: COMPLETED | OUTPATIENT
Start: 2019-04-27 | End: 2019-04-27

## 2019-04-27 RX ADMIN — Medication 25 MICROGRAM(S): at 06:30

## 2019-04-27 RX ADMIN — Medication 300 MILLIGRAM(S): at 12:14

## 2019-04-27 RX ADMIN — Medication 200 MICROGRAM(S): at 06:30

## 2019-04-27 RX ADMIN — ESCITALOPRAM OXALATE 20 MILLIGRAM(S): 10 TABLET, FILM COATED ORAL at 12:14

## 2019-04-27 RX ADMIN — Medication 325 MILLIGRAM(S): at 12:14

## 2019-04-27 RX ADMIN — Medication 100 GRAM(S): at 11:52

## 2019-04-27 RX ADMIN — MAGNESIUM OXIDE 400 MG ORAL TABLET 400 MILLIGRAM(S): 241.3 TABLET ORAL at 06:30

## 2019-04-27 RX ADMIN — SODIUM CHLORIDE 75 MILLILITER(S): 9 INJECTION, SOLUTION INTRAVENOUS at 05:54

## 2019-04-27 RX ADMIN — LISINOPRIL 20 MILLIGRAM(S): 2.5 TABLET ORAL at 06:30

## 2019-04-27 NOTE — CONSULT NOTE ADULT - ASSESSMENT
Mr. Dixon is a 62 year old man with multiple medical problems.  He has had several days of brief lightheadedness upon standing and also has more chronic dizziness while changing position in bed.  He likely has some mild peripheral vertigo (possibly residual from prior vertigo). If this is bothersome he can have outpatient vestibular therapy.  Otherwise, lightheadedness seems to be more orthostatic in nature particularly since he reports feeling better when drinking more water.  He is unable to have MRI brain due to AICD.  He reports having recent carotid ultrasound with his cardiologist which was stable and showed less than 50% stenosis.  Continue use of CPAP.  Avoid dehydration, skipping meals.  Follow up with his neurologist, Dr. Smalls. Consider physical therapy for low back pain.  Please call back if additional input is needed from neurology service.

## 2019-04-27 NOTE — DISCHARGE NOTE PROVIDER - NSDCCPCAREPLAN_GEN_ALL_CORE_FT
PRINCIPAL DISCHARGE DIAGNOSIS  Diagnosis: Near syncope  Assessment and Plan of Treatment: oral hydration   f/u with Neurologist         SECONDARY DISCHARGE DIAGNOSES  Diagnosis: Abnormal TSH  Assessment and Plan of Treatment: c/w current dose Levothyroxine   F/u with endocrinologist for repeal labs    Diagnosis: Thrombocytopenia  Assessment and Plan of Treatment: f/u with PCP to check labs again in 2-3 days    Diagnosis: Hematuria  Assessment and Plan of Treatment: Hold aspirin if another episode   F/u with PCP, will need urology evaluation

## 2019-04-27 NOTE — CONSULT NOTE ADULT - SUBJECTIVE AND OBJECTIVE BOX
Patient is a 58y old  Male who presents with a chief complaint of Near syncope, dizziness (26 Apr 2019 20:01)      HPI:  58 year old Man with hx obesity, Nontoxic Nodular Goiter s/p total thyroidectomy with frozen section 04/2010, RIK uses cpap, CAD s/p angioplasty 1980's f/b MI 1992 while undergoing stress test s/p 2V CABG at [facility], PPM placement ~7 yrs ago for "electrical problem";  anxiety, depression, DM, HTN, vertigo 20 yrs ago, neuropathy, bulging disc c/b chronic Lower extremity and back pain (had epidural 2 months ago), retina detachment, HFrEF 30-35%, IBS  presented to ED c/o sudden onset of dizziness while standing and several minutes after "working in the garage moving things"; he also had dizziness with change in position (turning head from side to side and from lying to sitting/standing position).    He reports that the lightheadedness upon sitting up in the morning lasts for seconds.   The more significant episode that occurred on the day of admission, he believes to be due to taking his medications on an empty stomach.  At times when he rolls over in bed he will also have brief dizziness.  He had severe vertigo once many years ago and states that he was in bed for ten days.  He denies any tinnitus or sensation of fullness in his ears.    He has an AICD which was interrogated and did not show any events.  Orthostatics were negative.  He reports having had burning with urination yesterday and states that today he had some blood in his urine.       PAST MEDICAL & SURGICAL HISTORY:  Sleep Apnea: cpap - [facility]  Heart Attack: 19 yrs ago  Nontoxic Nodular Goiter  Anxiety  Depression  Diabetes Mellitus Type II: x 7 yrs  CAD (Coronary Artery Disease)  HTN (Hypertension)  Morbid Obesity  History of tonsillectomy  AICD (automatic cardioverter/defibrillator) present  Abdominal Hernia: 2009  S/P Laparoscopic Cholecystectomy: 2008  S/P CABG X 2: 1992 - [facility]      FAMILY HISTORY: noncontributory      Social Hx:  Nonsmoker, no drug or alcohol use    MEDICATIONS  (STANDING):  allopurinol 300 milliGRAM(s) Oral daily  aspirin enteric coated 325 milliGRAM(s) Oral daily  dextrose 5%. 1000 milliLiter(s) (50 mL/Hr) IV Continuous <Continuous>  dextrose 50% Injectable 12.5 Gram(s) IV Push once  dextrose 50% Injectable 25 Gram(s) IV Push once  dextrose 50% Injectable 25 Gram(s) IV Push once  escitalopram 20 milliGRAM(s) Oral daily  gabapentin 600 milliGRAM(s) Oral at bedtime  insulin glargine Injectable (LANTUS) 12 Unit(s) SubCutaneous at bedtime  insulin lispro (HumaLOG) corrective regimen sliding scale   SubCutaneous three times a day before meals  levothyroxine 200 MICROGram(s) Oral daily  levothyroxine 25 MICROGram(s) Oral daily  lisinopril 20 milliGRAM(s) Oral daily  magnesium sulfate  IVPB 1 Gram(s) IV Intermittent once  simvastatin 20 milliGRAM(s) Oral at bedtime       Allergies    Contrast dye during a cardiac catheterization made him &quot;blow up&quot; (Other)  iodine (Unknown)    Intolerances        ROS: Pertinent positives in HPI, all other ROS were reviewed and are negative.      Vital Signs Last 24 Hrs  T(C): 36.4 (27 Apr 2019 04:52), Max: 37.3 (26 Apr 2019 22:02)  T(F): 97.5 (27 Apr 2019 04:52), Max: 99.1 (26 Apr 2019 22:02)  HR: 75 (27 Apr 2019 04:52) (75 - 84)  BP: 108/68 (27 Apr 2019 04:52) (108/68 - 140/47)  BP(mean): --  RR: 16 (27 Apr 2019 04:52) (16 - 18)  SpO2: 99% (27 Apr 2019 04:52) (94% - 99%)        Constitutional: awake and alert.  HEENT: PERRLA, EOMI,   Neck: Supple.  Respiratory: Breath sounds are clear bilaterally  Cardiovascular: S1 and S2, regular / irregular rhythm  Gastrointestinal: soft, nontender  Extremities:  no edema  Vascular: Carotid Bruit - no  Musculoskeletal: no joint swelling/tenderness, no abnormal movements  Skin: No rashes    Neurological exam:  HF: A x O x 3. Appropriately interactive, normal affect. Speech fluent, No Aphasia or paraphasic errors. Naming /repetition intact   CN: DILLAN, EOMI, VFF, facial sensation normal, no NLFD, tongue midline, Palate moves equally, SCM equal bilaterally  Motor: No pronator drift, Strength 5/5 in all 4 ext, normal bulk and tone, no tremor, rigidity or bradykinesia.    Sens: Mild decrease sensation in plantar surfaces of feet  Reflexes: Symmetric and hypoactive . BJ trace, BR trace, KJ trace, AJ absent downgoing toes b/l  Coord:  No FNFA, dysmetria, DAVIDE intact, heel to shin intact bilaterally      Labs:   04-27    140  |  108  |  19  ----------------------------<  133<H>  4.6   |  25  |  0.80    Ca    8.5      27 Apr 2019 05:52  Mg     1.6     04-27    TPro  7.4  /  Alb  3.4  /  TBili  0.5  /  DBili  x   /  AST  22  /  ALT  41  /  AlkPhos  57  04-26                              11.7   4.44  )-----------( 92       ( 27 Apr 2019 05:52 )             38.1       Radiology:    CT head 4/26/19:  No parenchymal contusion, hemorrhage or extra-axial collection.    No CT evidence of an acute territorial infarction.    If symptoms persist, and additional imaging evaluation is needed,   follow-up MRI is suggested.
PCP:    REQUESTING PHYSICIAN:    REASON FOR CONSULT:    CHIEF COMPLAINT:    HPI:  58 year old Man with hx obesity, Nontoxic Nodular Goiter s/p total thyroidectomy with frozen section 04/2010, RIK uses cpap, CAD s/p angioplasty 1980's f/b MI 1992 while undergoing stress test s/p 2V CABG at [facility], PPM placement ~7 yrs ago for "electrical problem";  anxiety, depression, DM, HTN, vertigo 20 yrs ago, neuropathy, bulging disc c/b chronic Lower extremity and back pain (had epidural 2 months ago), retina detachment, HFrEF 30-35%, IBS  presented to ED c/o sudden onset of dizziness while standing and several minutes after "working in the garage moving things"; he also had dizziness with change in position (turning head from side to side and from lying to sitting/standing position). Also had 1 episode of sudden/incontinent LBM yesterday. Denies CP/palpitation/SOB/HA/abd pain/n/v/f/c    In the ED plt 121 otherwise CBC normal, mg 1.4, TSH 0.14, TNI negative x1, ECG 1st degree avb no significant ST-T /Rhythm findings, Ua moderate blood, few bacteria, protein 15 otherwise normal, CT head no ICH/mass/shift. VT Silicontronic report from device interrogation by ED team with 1 episode of AF/VT between timeframe of Feb - April (no definite day and time identified). (26 Apr 2019 20:01)  4/27/19 Cardiology called to evaluate symptoms in light of patient's cardiac. Pt denies chest pain or shortness of breath. Reports recent comprehensive cardiac work up with his cardiologist at [facility].       PAST MEDICAL & SURGICAL HISTORY:  Sleep Apnea: cpap - [facility]  Heart Attack: 19 yrs ago  Nontoxic Nodular Goiter  Anxiety  Depression  Diabetes Mellitus Type II: x 7 yrs  CAD (Coronary Artery Disease)  HTN (Hypertension)  Morbid Obesity  History of tonsillectomy  AICD (automatic cardioverter/defibrillator) present  Abdominal Hernia: 2009  S/P Laparoscopic Cholecystectomy: 2008  S/P CABG X 2: 1992 - [facility]      Allergies    Contrast dye during a cardiac catheterization    Intolerances        SOCIAL HISTORY:    FAMILY HISTORY:      MEDICATIONS:  MEDICATIONS  (STANDING):  allopurinol 300 milliGRAM(s) Oral daily  aspirin enteric coated 325 milliGRAM(s) Oral daily  dextrose 5%. 1000 milliLiter(s) (50 mL/Hr) IV Continuous <Continuous>  dextrose 50% Injectable 12.5 Gram(s) IV Push once  dextrose 50% Injectable 25 Gram(s) IV Push once  dextrose 50% Injectable 25 Gram(s) IV Push once  escitalopram 20 milliGRAM(s) Oral daily  gabapentin 600 milliGRAM(s) Oral at bedtime  insulin glargine Injectable (LANTUS) 12 Unit(s) SubCutaneous at bedtime  insulin lispro (HumaLOG) corrective regimen sliding scale   SubCutaneous three times a day before meals  levothyroxine 200 MICROGram(s) Oral daily  levothyroxine 25 MICROGram(s) Oral daily  lisinopril 20 milliGRAM(s) Oral daily  simvastatin 20 milliGRAM(s) Oral at bedtime    MEDICATIONS  (PRN):  ALPRAZolam 0.5 milliGRAM(s) Oral three times a day PRN anxiety  dextrose 40% Gel 15 Gram(s) Oral once PRN Blood Glucose LESS THAN 70 milliGRAM(s)/deciliter  glucagon  Injectable 1 milliGRAM(s) IntraMuscular once PRN Glucose LESS THAN 70 milligrams/deciliter      REVIEW OF SYSTEMS:    CONSTITUTIONAL: No weakness, fevers or chills  EYES/ENT: No visual changes;  No vertigo or throat pain   NECK: No pain or stiffness  RESPIRATORY: No cough, wheezing, hemoptysis; No shortness of breath  CARDIOVASCULAR: No chest pain or palpitations  GASTROINTESTINAL: No abdominal or epigastric pain. No nausea, vomiting, or hematemesis; No diarrhea or constipation. No melena or hematochezia.  GENITOURINARY: No dysuria, frequency or hematuria  NEUROLOGICAL: Near syncope  SKIN: No itching, burning, rashes, or lesions   All other review of systems is negative unless indicated above    Vital Signs Last 24 Hrs  T(C): 36.4 (27 Apr 2019 04:52), Max: 37.3 (26 Apr 2019 22:02)  T(F): 97.5 (27 Apr 2019 04:52), Max: 99.1 (26 Apr 2019 22:02)  HR: 75 (27 Apr 2019 04:52) (75 - 84)  BP: 108/68 (27 Apr 2019 04:52) (108/68 - 140/47)  BP(mean): --  RR: 16 (27 Apr 2019 04:52) (16 - 18)  SpO2: 99% (27 Apr 2019 04:52) (94% - 99%)    I&O's Summary    26 Apr 2019 07:01  -  27 Apr 2019 07:00  --------------------------------------------------------  IN: 0 mL / OUT: 700 mL / NET: -700 mL        PHYSICAL EXAM:    Constitutional: NAD, awake and alert, well-developed  HEENT: PERR, EOMI,  No oral cyananosis.  Neck:  supple,  No JVD  Respiratory: Breath sounds are clear bilaterally, No wheezing, rales or rhonchi  Cardiovascular: S1 and S2, regular rate and rhythm, no Murmurs, gallops or rubs  Gastrointestinal: Bowel Sounds present, soft, nontender.   Extremities: No peripheral edema. No clubbing or cyanosis.  Vascular: 2+ peripheral pulses  Neurological: A/O x 3, no focal deficits  Musculoskeletal: no calf tenderness.  Skin: No rashes.      LABS: All Labs Reviewed:                        11.7   4.44  )-----------( 92       ( 27 Apr 2019 05:52 )             38.1                         12.5   5.74  )-----------( 121      ( 26 Apr 2019 16:15 )             40.2     27 Apr 2019 05:52    140    |  108    |  19     ----------------------------<  133    4.6     |  25     |  0.80   26 Apr 2019 16:15    140    |  108    |  24     ----------------------------<  145    4.5     |  22     |  0.96     Ca    8.5        27 Apr 2019 05:52  Ca    8.6        26 Apr 2019 16:15  Mg     1.6       27 Apr 2019 05:52  Mg     1.4       26 Apr 2019 16:15    TPro  7.4    /  Alb  3.4    /  TBili  0.5    /  DBili  x      /  AST  22     /  ALT  41     /  AlkPhos  57     26 Apr 2019 16:15      CARDIAC MARKERS ( 26 Apr 2019 19:35 )  <0.015 ng/mL / x     / x     / x     / x      CARDIAC MARKERS ( 26 Apr 2019 16:15 )  <0.015 ng/mL / x     / x     / x     / x          Blood Culture:     04-26 @ 16:15  TSH: 0.14      RADIOLOGY/EKG:< from: 12 Lead ECG (04.27.19 @ 08:07) >  Diagnosis Line Normal sinus rhythm  Non-specific intra-ventricular conduction block  Cannot rule out Septal infarct  Abnormal ECG    Confirmed by RUI AYON, KENNETH ([id number]) on 4/27/2019 1:44:29 PM    < end of copied text >

## 2019-04-27 NOTE — DISCHARGE NOTE PROVIDER - CARE PROVIDER_API CALL
Leroy Bustamante)  Internal Medicine  [address] [place] [address]  Phone: [phone]  Fax: [phone]  Follow Up Time: 1-3 days    Kyle Sagastume)  Neurology  [address] [place] [address], [place] [address]  Phone: [phone]  Fax: [phone]  Follow Up Time: 1 week

## 2019-04-27 NOTE — DISCHARGE NOTE PROVIDER - HOSPITAL COURSE
58 year old Man with hx obesity, Nontoxic Nodular Goiter s/p total thyroidectomy with frozen section 04/2010, RIK uses cpap, CAD s/p angioplasty 1980's f/b MI 1992 while undergoing stress test s/p 2V CABG at [facility], PPM placement ~7 yrs ago for "electrical problem";  anxiety, depression, DM, HTN, vertigo 20 yrs ago, neuropathy, bulging disc,  chronic Lower extremity and back pain (had epidural 2 months ago), R eye  retina detachment, HFrEF 30-35%, IBS  presented to ED c/o sudden onset of dizziness while standing and several minutes after "working in the garage moving things"; he also had dizziness with change in position (turning head from side to side and from lying to sitting/standing position). Also had 1 episode of sudden/incontinent LBM day prior. No  CP/palpitation/SOB/HA/abd pain/n/v/f/c    In the ED plt 121 otherwise CBC normal, mg 1.4, TSH 0.14, TNI negative x1, ECG 1st degree avb no significant ST-T /Rhythm findings, Ua moderate blood, few bacteria, protein 15 otherwise normal, CT head no ICH/mass/shift. Parsetronic report from device interrogation by ED team with 1 episode of AF/VT between timeframe of Feb - April (no definite day and time identified).    Pt was admitted to telemetry. Started on gentle IVF. Initial Orthostatics negative, but Pt is symptomatic. Pt was evaluated bt EP and no VT/VF events noted. No events on telemetry. Pt also was evaluated by neuro. Likely positional vertigo. Pt is not candidate for MRI. OutPt f/u with neuro and possible vestibular rehab recommended. Pt was evaluate dby cardio and cleared for d/c.     Hospital course significant for hematuria episode x 1,  no further episodes as per Pt, Urinalysis repeat today with improvement. Will need to further f/u with PCP and urology.     Today Pt reports overall feels better, but still symptomatic on getting     up from the bed, states about same as it was, lasts only few seconds.  Ambulated in the room. Oral hydration recommended. Also outPt neuro f/u discussed.     Vital Signs Last 24 Hrs    T(C): 36.4 (27 Apr 2019 04:52), Max: 37.3 (26 Apr 2019 22:02)    T(F): 97.5 (27 Apr 2019 04:52), Max: 99.1 (26 Apr 2019 22:02)    HR: 75 (27 Apr 2019 04:52) (75 - 84)    BP: 108/68 (27 Apr 2019 04:52) (108/68 - 135/60)    RR: 16 (27 Apr 2019 04:52) (16 - 18)    SpO2: 99% (27 Apr 2019 04:52) (94% - 99%)        PHYSICAL EXAM:    General: Well developed; well nourished; in no acute distress    Eyes: PERRLA, EOMI; mild erythema R conjunctiva     Head: Normocephalic; atraumatic    ENMT: No nasal discharge; airway clear    Neck: Supple; non tender; no masses    Respiratory: Good air entry, No wheezes, rales or rhonchi    Cardiovascular: Regular rate and rhythm. S1 and S2 Normal; No murmurs    Gastrointestinal: Soft non-tender non-distended; Normal bowel sounds    Genitourinary: No costovertebral angle tenderness    Extremities: Normal range of motion, No clubbing, cyanosis or edema    Vascular: Peripheral pulses palpable 2+ bilaterally    Neurological: Alert and oriented x4, non focal, no nystagmus     Skin: Warm and dry. No acute rash    Lymph Nodes: No acute cervical adenopathy    Musculoskeletal: Normal muscle  tone, without deformities    Psychiatric: Cooperative and appropriate        A/P:    Dizziness w/ near syncope likely  peripheral  vertigo  and orthostasis, possible autonomic dysfx     - telemetry: SR with 1st degree AV block, no events    - EP eval appreciated, PPM interrogated, no episodes of VF/VT    - orthostatic vitals: negative, but mildly symptomatic     - S/p IVF     - Reports from outPt cardio office reviewed, HAd ECHO done: EF 35%, stable. CArorid duplex: + R sided stenosis 45%     - consult cardiology appreciated    - d/w Dr Oneal             CAD/HFrEF - chronic    stable / chest pain free / appears compensated    con't statin / asa/ spironolactone/lisinopril     not on BB likely d/t AVB        T2DM / obesity    con't home meds on  metformin and Trulicity     recommended to check am BS and with episodes     RN to provide education, Pt reports that has machine at home     Will follow with PCP         hypomagnesia    repleted         Thrombocypenia  - chronicity unknown    will need to f/u with PCP to repeat  CBC in 2-3 days         Hematuria, had one episode     no signs of retention, voids freely    Nedds f/u with PCP and repeat UA, will need urology eval         Anxiety    stable    con't home meds        Chronic pain / neuropathy    con't home dose Neurontin        Hypothyroid d/t thyroidectomy    TSH 0.14 T4 WNL    con't home dose     as per Pt  followed by endo and dose was adjusted up and down couple of times, last dose od levothyroxine was changed about 5 weeks ago ( increased from 200mcg). Pt is due for f/u         RIK    c/w CPAP QHS         Disspo: stable for d/c home today.     FAx d/c summary to PCP     total time 60min

## 2019-04-27 NOTE — DISCHARGE NOTE PROVIDER - PROVIDER TOKENS
PROVIDER:[TOKEN:[[id number]],FOLLOWUP:[1-3 days]],PROVIDER:[TOKEN:[[id number]],FOLLOWUP:[1 week]]

## 2019-04-27 NOTE — CONSULT NOTE ADULT - PROBLEM SELECTOR RECOMMENDATION 9
No recurrence. No arrhythmia since admission. Troponin negative. No suspicious ECG changes. Recent w/u by cardiology. Monitor for a total 24 hours can d/c if no arrhythmia

## 2019-04-27 NOTE — DISCHARGE NOTE NURSING/CASE MANAGEMENT/SOCIAL WORK - NSDCDPATPORTLINK_GEN_ALL_CORE
You can access the [facility], offered by [facility], by registering with the following website: [email or web address]

## 2019-11-26 ENCOUNTER — TRANSCRIPTION ENCOUNTER (OUTPATIENT)
Age: 59
End: 2019-11-26

## 2020-03-02 ENCOUNTER — TRANSCRIPTION ENCOUNTER (OUTPATIENT)
Age: 60
End: 2020-03-02

## 2020-08-07 NOTE — ASU PREOP CHECKLIST - SPO2 (%)
The scribe's documentation has been prepared under my direction and personally reviewed by me in its entirety. I confirm that the note above accurately reflects all work, treatment, procedures, and medical decision making performed by me.
95

## 2021-01-18 ENCOUNTER — OUTPATIENT (OUTPATIENT)
Dept: OUTPATIENT SERVICES | Facility: HOSPITAL | Age: 61
LOS: 1 days | End: 2021-01-18
Payer: MEDICARE

## 2021-01-18 DIAGNOSIS — Z20.828 CONTACT WITH AND (SUSPECTED) EXPOSURE TO OTHER VIRAL COMMUNICABLE DISEASES: ICD-10-CM

## 2021-01-18 DIAGNOSIS — Z90.89 ACQUIRED ABSENCE OF OTHER ORGANS: Chronic | ICD-10-CM

## 2021-01-18 DIAGNOSIS — Z95.810 PRESENCE OF AUTOMATIC (IMPLANTABLE) CARDIAC DEFIBRILLATOR: Chronic | ICD-10-CM

## 2021-01-18 PROCEDURE — U0003: CPT

## 2021-01-18 PROCEDURE — C9803: CPT

## 2021-01-18 PROCEDURE — U0005: CPT

## 2021-01-19 DIAGNOSIS — Z20.828 CONTACT WITH AND (SUSPECTED) EXPOSURE TO OTHER VIRAL COMMUNICABLE DISEASES: ICD-10-CM

## 2021-01-19 LAB — SARS-COV-2 RNA SPEC QL NAA+PROBE: SIGNIFICANT CHANGE UP

## 2021-02-23 ENCOUNTER — OUTPATIENT (OUTPATIENT)
Dept: OUTPATIENT SERVICES | Facility: HOSPITAL | Age: 61
LOS: 1 days | End: 2021-02-23
Payer: MEDICARE

## 2021-02-23 DIAGNOSIS — Z20.828 CONTACT WITH AND (SUSPECTED) EXPOSURE TO OTHER VIRAL COMMUNICABLE DISEASES: ICD-10-CM

## 2021-02-23 DIAGNOSIS — Z95.810 PRESENCE OF AUTOMATIC (IMPLANTABLE) CARDIAC DEFIBRILLATOR: Chronic | ICD-10-CM

## 2021-02-23 DIAGNOSIS — Z90.89 ACQUIRED ABSENCE OF OTHER ORGANS: Chronic | ICD-10-CM

## 2021-02-23 LAB — SARS-COV-2 RNA SPEC QL NAA+PROBE: SIGNIFICANT CHANGE UP

## 2021-02-23 PROCEDURE — U0005: CPT

## 2021-02-23 PROCEDURE — C9803: CPT

## 2021-02-23 PROCEDURE — U0003: CPT

## 2021-02-24 DIAGNOSIS — Z20.828 CONTACT WITH AND (SUSPECTED) EXPOSURE TO OTHER VIRAL COMMUNICABLE DISEASES: ICD-10-CM

## 2021-03-09 NOTE — H&P ADULT - NSHPPOAPULMEMBOLUS_GEN_A_CORE
Ultrasound report was not available for me to review today.  Again we are waiting for prior authorization from insurance company to perform ultrasound.  At next availability we will confirm findings.   no

## 2021-06-13 NOTE — ASU PREOP CHECKLIST - SURGICAL CONSENT
RN cannot approve Refill Request    RN can NOT refill this medication med is not covered by policy/route to provider. Last office visit: 5/1/2020 Kelby Dumont MD Last Physical: Visit date not found Last MTM visit: Visit date not found Last visit same specialty: 5/1/2020 Kelby Dumont MD.  Next visit within 3 mo: Visit date not found  Next physical within 3 mo: Visit date not found      Sherry Newman, [facility] Triage/Med Refill 11/30/2020    Requested Prescriptions   Pending Prescriptions Disp Refills     XIFAXAN 550 mg Tab tablet [Pharmacy Med Name: XIFAXAN 550MG TAB] 60 tablet 3     Sig: TAKE 1 TABLET BY MOUTH TWICE A DAY       There is no refill protocol information for this order            done

## 2021-07-11 ENCOUNTER — TRANSCRIPTION ENCOUNTER (OUTPATIENT)
Age: 61
End: 2021-07-11

## 2022-02-03 NOTE — ASU PREOP CHECKLIST - NEEDLE GAUGE
[Alert] : alert [Oriented to Person] : oriented to person [Oriented to Place] : oriented to place [Oriented to Time] : oriented to time [Calm] : calm [de-identified] : He  is alert, well-groomed, and in NAD\par   [de-identified] : anicteric.  Nasal mucosa pink, septum midline. Oral mucosa pink.  Tongue midline, Pharynx without exudates.\par   [de-identified] : Neck supple. Trachea midline. Thyroid isthmus barely palpable, lobes not felt.\par   [de-identified] : reducible Right  inguinal hernia.  No evidence of hernia on the opposite side.  No penile discharge or lesions.  No scrotal swelling or discoloration. Testes descended bilaterally, smooth, without masses. Epididymis non-tender.  Reducible umbilical hernia , small defect  22

## 2022-05-06 ENCOUNTER — NON-APPOINTMENT (OUTPATIENT)
Age: 62
End: 2022-05-06

## 2023-05-24 NOTE — ASU PATIENT PROFILE, ADULT - PRO TOBACCO TYPE
Patient referred by RN Brook Thomas regarding CGM issues. Patient  reporting false alarms in the middle of the night since using sensor on the right arm. They turned off the alarm for 70 but it has dropped even below the 54 alarm which cannot be turned off. Connected with patient via [facility] and reviewed data. Some lows do appear to be compression lows but others may actually be true lows. Would like for patient to check sugars with finger stick when this happens so we can figure out where things are really running. Patient also has not been eating as much lately 2/2 cancer treatment. She is not currently on any diabetes medications. She has a follow up with oncology today to see if she needs more fluids. Advised them to let them know about the lower blood sugars. Meter/strips at  for patient to . Patient  to call me if he needs help with using the machine and we can set up appointment in the next day or 2 to review.         Michelle Durant, PharmD, [facility], [address] [facility]    Program: Medical Group  CPA in place:  Yes  Recommendation Provided To: Patient/Caregiver: 2 via Telephone  Intervention Detail: Device Training and Patient Access Assistance/Sample Provided  Intervention Accepted By: Patient/Caregiver: 2   Time Spent (min): 20
cigarettes/quit 30 years ago

## 2025-04-15 ENCOUNTER — APPOINTMENT (OUTPATIENT)
Dept: UROLOGY | Facility: CLINIC | Age: 65
End: 2025-04-15
Payer: MEDICARE

## 2025-04-15 VITALS
DIASTOLIC BLOOD PRESSURE: 72 MMHG | OXYGEN SATURATION: 96 % | RESPIRATION RATE: 16 BRPM | SYSTOLIC BLOOD PRESSURE: 113 MMHG | WEIGHT: 258 LBS | HEIGHT: 72 IN | BODY MASS INDEX: 34.95 KG/M2 | HEART RATE: 86 BPM

## 2025-04-15 DIAGNOSIS — R35.0 FREQUENCY OF MICTURITION: ICD-10-CM

## 2025-04-15 DIAGNOSIS — R35.1 BENIGN PROSTATIC HYPERPLASIA WITH LOWER URINARY TRACT SYMPMS: ICD-10-CM

## 2025-04-15 DIAGNOSIS — N40.1 BENIGN PROSTATIC HYPERPLASIA WITH LOWER URINARY TRACT SYMPMS: ICD-10-CM

## 2025-04-15 DIAGNOSIS — Z12.5 ENCOUNTER FOR SCREENING FOR MALIGNANT NEOPLASM OF PROSTATE: ICD-10-CM

## 2025-04-15 PROCEDURE — 99204 OFFICE O/P NEW MOD 45 MIN: CPT

## 2025-04-15 RX ORDER — TAMSULOSIN HYDROCHLORIDE 0.4 MG/1
0.4 CAPSULE ORAL
Qty: 90 | Refills: 2 | Status: ACTIVE | COMMUNITY
Start: 2025-04-15 | End: 1900-01-01

## 2025-04-16 LAB
APPEARANCE: CLEAR
BACTERIA: NEGATIVE /HPF
BILIRUBIN URINE: NEGATIVE
BLOOD URINE: NEGATIVE
CAST: 1 /LPF
COLOR: YELLOW
EPITHELIAL CELLS: 0 /HPF
GLUCOSE QUALITATIVE U: >=1000 MG/DL
KETONES URINE: NEGATIVE MG/DL
LEUKOCYTE ESTERASE URINE: NEGATIVE
MICROSCOPIC-UA: NORMAL
NITRITE URINE: NEGATIVE
PH URINE: 5.5
PROTEIN URINE: NORMAL MG/DL
PSA SERPL-MCNC: 0.95 NG/ML
RED BLOOD CELLS URINE: 0 /HPF
SPECIFIC GRAVITY URINE: 1.02
UROBILINOGEN URINE: 0.2 MG/DL
WHITE BLOOD CELLS URINE: 0 /HPF

## 2025-04-17 LAB
BACTERIA UR CULT: NORMAL
URINE CYTOLOGY: NORMAL

## 2025-04-23 ENCOUNTER — NON-APPOINTMENT (OUTPATIENT)
Age: 65
End: 2025-04-23

## 2025-05-12 ENCOUNTER — APPOINTMENT (OUTPATIENT)
Dept: UROLOGY | Facility: CLINIC | Age: 65
End: 2025-05-12
Payer: MEDICARE

## 2025-05-12 ENCOUNTER — APPOINTMENT (OUTPATIENT)
Dept: UROLOGY | Facility: CLINIC | Age: 65
End: 2025-05-12

## 2025-05-12 VITALS
RESPIRATION RATE: 14 BRPM | TEMPERATURE: 98 F | HEIGHT: 72 IN | OXYGEN SATURATION: 97 % | SYSTOLIC BLOOD PRESSURE: 124 MMHG | WEIGHT: 255 LBS | DIASTOLIC BLOOD PRESSURE: 83 MMHG | HEART RATE: 95 BPM | BODY MASS INDEX: 34.54 KG/M2

## 2025-05-12 DIAGNOSIS — K62.89 OTHER SPECIFIED DISEASES OF ANUS AND RECTUM: ICD-10-CM

## 2025-05-12 DIAGNOSIS — N40.1 BENIGN PROSTATIC HYPERPLASIA WITH LOWER URINARY TRACT SYMPMS: ICD-10-CM

## 2025-05-12 DIAGNOSIS — Z12.5 ENCOUNTER FOR SCREENING FOR MALIGNANT NEOPLASM OF PROSTATE: ICD-10-CM

## 2025-05-12 DIAGNOSIS — R35.1 BENIGN PROSTATIC HYPERPLASIA WITH LOWER URINARY TRACT SYMPMS: ICD-10-CM

## 2025-05-12 DIAGNOSIS — R33.9 RETENTION OF URINE, UNSPECIFIED: ICD-10-CM

## 2025-05-12 DIAGNOSIS — R35.0 FREQUENCY OF MICTURITION: ICD-10-CM

## 2025-05-12 PROCEDURE — 76872 US TRANSRECTAL: CPT

## 2025-05-12 RX ORDER — BETHANECHOL CHLORIDE 50 MG/1
50 TABLET ORAL
Qty: 60 | Refills: 2 | Status: ACTIVE | COMMUNITY
Start: 2025-05-12 | End: 1900-01-01

## 2025-05-12 RX ORDER — HYDROCORTISONE ACETATE 25 MG/1
25 SUPPOSITORY RECTAL TWICE DAILY
Qty: 14 | Refills: 3 | Status: ACTIVE | COMMUNITY
Start: 2025-05-12 | End: 1900-01-01

## 2025-05-19 RX ORDER — TAMSULOSIN HYDROCHLORIDE 0.4 MG/1
0.4 CAPSULE ORAL
Qty: 180 | Refills: 2 | Status: ACTIVE | COMMUNITY
Start: 2025-05-12 | End: 1900-01-01

## 2025-06-16 ENCOUNTER — APPOINTMENT (OUTPATIENT)
Dept: UROLOGY | Facility: CLINIC | Age: 65
End: 2025-06-16
Payer: MEDICARE

## 2025-06-16 VITALS
BODY MASS INDEX: 33.18 KG/M2 | HEART RATE: 92 BPM | OXYGEN SATURATION: 96 % | SYSTOLIC BLOOD PRESSURE: 118 MMHG | WEIGHT: 245 LBS | DIASTOLIC BLOOD PRESSURE: 75 MMHG | RESPIRATION RATE: 16 BRPM | HEIGHT: 72 IN

## 2025-06-16 PROCEDURE — 51797 INTRAABDOMINAL PRESSURE TEST: CPT

## 2025-06-16 PROCEDURE — 51798 US URINE CAPACITY MEASURE: CPT

## 2025-06-16 PROCEDURE — 51784 ANAL/URINARY MUSCLE STUDY: CPT

## 2025-06-16 PROCEDURE — 51729 CYSTOMETROGRAM W/VP&UP: CPT

## 2025-06-16 RX ORDER — LIDOCAINE 50 MG/G
5 CREAM TOPICAL
Qty: 1 | Refills: 0 | Status: ACTIVE | COMMUNITY
Start: 2025-06-16 | End: 1900-01-01

## 2025-06-27 ENCOUNTER — APPOINTMENT (OUTPATIENT)
Dept: COLORECTAL SURGERY | Facility: CLINIC | Age: 65
End: 2025-06-27
Payer: MEDICARE

## 2025-06-27 VITALS
DIASTOLIC BLOOD PRESSURE: 63 MMHG | HEART RATE: 78 BPM | BODY MASS INDEX: 33.23 KG/M2 | SYSTOLIC BLOOD PRESSURE: 95 MMHG | WEIGHT: 245 LBS

## 2025-06-27 PROBLEM — F17.290 CIGAR SMOKER: Status: ACTIVE | Noted: 2025-06-27

## 2025-06-27 PROBLEM — E11.9 TYPE 2 DIABETES MELLITUS WITHOUT COMPLICATION, UNSPECIFIED WHETHER LONG TERM INSULIN USE: Status: ACTIVE | Noted: 2025-06-27

## 2025-06-27 PROBLEM — I50.9 CONGESTIVE HEART FAILURE: Status: ACTIVE | Noted: 2025-06-27

## 2025-06-27 PROBLEM — I51.9 HEART PROBLEM: Status: RESOLVED | Noted: 2025-06-27 | Resolved: 2025-06-27

## 2025-06-27 PROBLEM — N18.9 CHRONIC KIDNEY DISEASE: Status: ACTIVE | Noted: 2025-06-27

## 2025-06-27 PROCEDURE — 99204 OFFICE O/P NEW MOD 45 MIN: CPT

## 2025-06-30 PROBLEM — Z86.2 HISTORY OF ANEMIA: Status: RESOLVED | Noted: 2025-06-30 | Resolved: 2025-06-30

## 2025-06-30 PROBLEM — Z87.39 HISTORY OF OSTEOPOROSIS: Status: RESOLVED | Noted: 2025-06-30 | Resolved: 2025-06-30

## 2025-06-30 PROBLEM — Z86.79 HISTORY OF HYPERTENSION: Status: RESOLVED | Noted: 2025-06-30 | Resolved: 2025-06-30

## 2025-06-30 PROBLEM — Z82.49 FAMILY HISTORY OF HYPERTENSION: Status: ACTIVE | Noted: 2025-06-30

## 2025-06-30 PROBLEM — Z82.49 FAMILY HISTORY OF CARDIAC DISORDER: Status: ACTIVE | Noted: 2025-06-30

## 2025-06-30 PROBLEM — Z83.3 FAMILY HISTORY OF DIABETES MELLITUS: Status: ACTIVE | Noted: 2025-06-30

## 2025-06-30 PROBLEM — Z86.69 HISTORY OF SLEEP APNEA: Status: RESOLVED | Noted: 2025-06-30 | Resolved: 2025-06-30

## 2025-06-30 PROBLEM — Z86.59 HISTORY OF ANXIETY: Status: RESOLVED | Noted: 2025-06-30 | Resolved: 2025-06-30

## 2025-06-30 PROBLEM — Z86.59 HISTORY OF DEPRESSION: Status: RESOLVED | Noted: 2025-06-30 | Resolved: 2025-06-30

## 2025-06-30 PROBLEM — Z86.79 HISTORY OF ATRIAL FIBRILLATION: Status: RESOLVED | Noted: 2025-06-30 | Resolved: 2025-06-30

## 2025-07-14 ENCOUNTER — APPOINTMENT (OUTPATIENT)
Dept: UROLOGY | Facility: CLINIC | Age: 65
End: 2025-07-14

## 2025-07-25 ENCOUNTER — APPOINTMENT (OUTPATIENT)
Dept: COLORECTAL SURGERY | Facility: CLINIC | Age: 65
End: 2025-07-25
Payer: MEDICARE

## 2025-07-25 VITALS
SYSTOLIC BLOOD PRESSURE: 91 MMHG | DIASTOLIC BLOOD PRESSURE: 63 MMHG | HEART RATE: 94 BPM | WEIGHT: 245 LBS | BODY MASS INDEX: 33.23 KG/M2

## 2025-07-25 VITALS — RESPIRATION RATE: 16 BRPM

## 2025-07-25 DIAGNOSIS — K60.2 ANAL FISSURE, UNSPECIFIED: ICD-10-CM

## 2025-07-25 PROCEDURE — 99213 OFFICE O/P EST LOW 20 MIN: CPT

## 2025-08-19 ENCOUNTER — APPOINTMENT (OUTPATIENT)
Dept: COLORECTAL SURGERY | Facility: CLINIC | Age: 65
End: 2025-08-19
Payer: MEDICARE

## 2025-08-19 VITALS
WEIGHT: 245 LBS | SYSTOLIC BLOOD PRESSURE: 103 MMHG | OXYGEN SATURATION: 94 % | HEIGHT: 72 IN | RESPIRATION RATE: 14 BRPM | BODY MASS INDEX: 33.18 KG/M2 | HEART RATE: 101 BPM | DIASTOLIC BLOOD PRESSURE: 67 MMHG

## 2025-08-19 DIAGNOSIS — K60.2 ANAL FISSURE, UNSPECIFIED: ICD-10-CM

## 2025-08-19 PROCEDURE — 99213 OFFICE O/P EST LOW 20 MIN: CPT | Mod: 25

## 2025-08-19 PROCEDURE — 46940 TREATMENT OF ANAL FISSURE: CPT

## 2025-08-19 RX ORDER — OXYCODONE 5 MG/1
5 TABLET ORAL
Qty: 12 | Refills: 0 | Status: ACTIVE | COMMUNITY
Start: 2025-08-19 | End: 1900-01-01

## 2025-09-16 ENCOUNTER — NON-APPOINTMENT (OUTPATIENT)
Age: 65
End: 2025-09-16

## 2025-09-16 ENCOUNTER — APPOINTMENT (OUTPATIENT)
Dept: COLORECTAL SURGERY | Facility: CLINIC | Age: 65
End: 2025-09-16
Payer: MEDICARE

## 2025-09-16 VITALS
BODY MASS INDEX: 31.83 KG/M2 | WEIGHT: 235 LBS | SYSTOLIC BLOOD PRESSURE: 115 MMHG | DIASTOLIC BLOOD PRESSURE: 67 MMHG | OXYGEN SATURATION: 97 % | HEIGHT: 72 IN | RESPIRATION RATE: 14 BRPM | HEART RATE: 88 BPM

## 2025-09-16 DIAGNOSIS — K60.2 ANAL FISSURE, UNSPECIFIED: ICD-10-CM

## 2025-09-16 PROCEDURE — 99213 OFFICE O/P EST LOW 20 MIN: CPT

## 2025-09-18 ENCOUNTER — APPOINTMENT (OUTPATIENT)
Dept: UROLOGY | Facility: CLINIC | Age: 65
End: 2025-09-18
Payer: MEDICARE

## 2025-09-18 DIAGNOSIS — R35.0 FREQUENCY OF MICTURITION: ICD-10-CM

## 2025-09-18 DIAGNOSIS — R33.9 RETENTION OF URINE, UNSPECIFIED: ICD-10-CM

## 2025-09-18 DIAGNOSIS — R35.1 BENIGN PROSTATIC HYPERPLASIA WITH LOWER URINARY TRACT SYMPMS: ICD-10-CM

## 2025-09-18 DIAGNOSIS — N40.1 BENIGN PROSTATIC HYPERPLASIA WITH LOWER URINARY TRACT SYMPMS: ICD-10-CM

## 2025-09-18 PROCEDURE — 99213 OFFICE O/P EST LOW 20 MIN: CPT | Mod: 25

## 2025-09-18 PROCEDURE — 51798 US URINE CAPACITY MEASURE: CPT

## 2025-09-18 PROCEDURE — 51741 ELECTRO-UROFLOWMETRY FIRST: CPT
